# Patient Record
Sex: FEMALE | Race: OTHER | URBAN - METROPOLITAN AREA
[De-identification: names, ages, dates, MRNs, and addresses within clinical notes are randomized per-mention and may not be internally consistent; named-entity substitution may affect disease eponyms.]

---

## 2017-12-08 ENCOUNTER — INPATIENT (INPATIENT)
Facility: HOSPITAL | Age: 32
LOS: 2 days | Discharge: HOME | DRG: 101 | End: 2017-12-11
Attending: HOSPITALIST | Admitting: HOSPITALIST
Payer: COMMERCIAL

## 2017-12-08 VITALS
DIASTOLIC BLOOD PRESSURE: 93 MMHG | HEART RATE: 87 BPM | OXYGEN SATURATION: 100 % | RESPIRATION RATE: 18 BRPM | SYSTOLIC BLOOD PRESSURE: 149 MMHG | TEMPERATURE: 98 F

## 2017-12-08 LAB
ALBUMIN SERPL ELPH-MCNC: 3.2 G/DL — LOW (ref 3.4–5)
ALP SERPL-CCNC: 56 U/L — SIGNIFICANT CHANGE UP (ref 40–120)
ALT FLD-CCNC: 52 U/L — HIGH (ref 12–42)
ANION GAP SERPL CALC-SCNC: 7 MMOL/L — LOW (ref 9–16)
APPEARANCE UR: CLEAR — SIGNIFICANT CHANGE UP
AST SERPL-CCNC: 33 U/L — SIGNIFICANT CHANGE UP (ref 15–37)
BILIRUB SERPL-MCNC: 0.3 MG/DL — SIGNIFICANT CHANGE UP (ref 0.2–1.2)
BILIRUB UR-MCNC: NEGATIVE — SIGNIFICANT CHANGE UP
BUN SERPL-MCNC: 13 MG/DL — SIGNIFICANT CHANGE UP (ref 7–23)
CALCIUM SERPL-MCNC: 9.2 MG/DL — SIGNIFICANT CHANGE UP (ref 8.5–10.5)
CHLORIDE SERPL-SCNC: 107 MMOL/L — SIGNIFICANT CHANGE UP (ref 96–108)
CO2 SERPL-SCNC: 26 MMOL/L — SIGNIFICANT CHANGE UP (ref 22–31)
COLOR SPEC: YELLOW — SIGNIFICANT CHANGE UP
CREAT SERPL-MCNC: 0.88 MG/DL — SIGNIFICANT CHANGE UP (ref 0.5–1.3)
DIFF PNL FLD: (no result)
GLUCOSE SERPL-MCNC: 104 MG/DL — HIGH (ref 70–99)
GLUCOSE UR QL: NEGATIVE — SIGNIFICANT CHANGE UP
HCG UR QL: NEGATIVE — SIGNIFICANT CHANGE UP
HCT VFR BLD CALC: 36.1 % — SIGNIFICANT CHANGE UP (ref 34.5–45)
HGB BLD-MCNC: 11.9 G/DL — SIGNIFICANT CHANGE UP (ref 11.5–15.5)
KETONES UR-MCNC: NEGATIVE — SIGNIFICANT CHANGE UP
LEUKOCYTE ESTERASE UR-ACNC: NEGATIVE — SIGNIFICANT CHANGE UP
MAGNESIUM SERPL-MCNC: 1.9 MG/DL — SIGNIFICANT CHANGE UP (ref 1.6–2.6)
MCHC RBC-ENTMCNC: 28.4 PG — SIGNIFICANT CHANGE UP (ref 27–34)
MCHC RBC-ENTMCNC: 33 G/DL — SIGNIFICANT CHANGE UP (ref 32–36)
MCV RBC AUTO: 86.2 FL — SIGNIFICANT CHANGE UP (ref 80–100)
NITRITE UR-MCNC: NEGATIVE — SIGNIFICANT CHANGE UP
PCP SPEC-MCNC: SIGNIFICANT CHANGE UP
PH UR: 5.5 — SIGNIFICANT CHANGE UP (ref 5–8)
PHOSPHATE SERPL-MCNC: 2.4 MG/DL — LOW (ref 2.5–4.5)
PLATELET # BLD AUTO: 171 K/UL — SIGNIFICANT CHANGE UP (ref 150–400)
POTASSIUM SERPL-MCNC: 4.1 MMOL/L — SIGNIFICANT CHANGE UP (ref 3.5–5.3)
POTASSIUM SERPL-SCNC: 4.1 MMOL/L — SIGNIFICANT CHANGE UP (ref 3.5–5.3)
PROT SERPL-MCNC: 7.6 G/DL — SIGNIFICANT CHANGE UP (ref 6.4–8.2)
PROT UR-MCNC: 100 MG/DL
RBC # BLD: 4.19 M/UL — SIGNIFICANT CHANGE UP (ref 3.8–5.2)
RBC # FLD: 12.7 % — SIGNIFICANT CHANGE UP (ref 10.3–16.9)
SODIUM SERPL-SCNC: 140 MMOL/L — SIGNIFICANT CHANGE UP (ref 132–145)
SP GR SPEC: >=1.03 — SIGNIFICANT CHANGE UP (ref 1–1.03)
UROBILINOGEN FLD QL: 0.2 E.U./DL — SIGNIFICANT CHANGE UP
WBC # BLD: 7.7 K/UL — SIGNIFICANT CHANGE UP (ref 3.8–10.5)
WBC # FLD AUTO: 7.7 K/UL — SIGNIFICANT CHANGE UP (ref 3.8–10.5)

## 2017-12-08 PROCEDURE — 99285 EMERGENCY DEPT VISIT HI MDM: CPT | Mod: 25

## 2017-12-08 PROCEDURE — 95951: CPT | Mod: 26,52

## 2017-12-08 PROCEDURE — 93010 ELECTROCARDIOGRAM REPORT: CPT

## 2017-12-08 PROCEDURE — 70450 CT HEAD/BRAIN W/O DYE: CPT | Mod: 26

## 2017-12-08 NOTE — ED PROVIDER NOTE - CRANIAL NERVE AND PUPILLARY EXAM
cough reflex intact/cranial nerves 2-12 intact/central and peripheral vision intact/corneal reflex intact

## 2017-12-08 NOTE — ED PROVIDER NOTE - OBJECTIVE STATEMENT
32y F with no PMHx, here with coworker BIB EMS s/p seizure. Pt states she was sitting at her desk today and told she had a sz. Coworker present witnessed sz, states pt's whole body began shaking and also began foaming at the mouth. Coworker laid pt down on floor. Sz activity lasted for approximately 5 minutes. When pt woke up 32y F with no PMHx, here with coworker BIB EMS s/p seizure. Pt states she was sitting at her desk today and told she had a sz. Coworker present witnessed sz, states pt's whole body began shaking and also began foaming at the mouth. Pt had also bit inner lip. Coworker laid pt down on floor. Sz activity lasted for approximately 5 minutes. When pt woke up, pt was confused for about 2 minutes. No bowel or urinary incontinence. No hx of sz. 32y F with no PMHx, here with coworker BIB EMS s/p seizure. Pt states she was sitting at her desk today and was lateral told she had a seizure Coworker present  at bedside witnessed seizure - she states pt's whole body began shaking while she was sitting in her chair and also began foaming at the mouth - they ended up laying her down on the floor. she had also bit her lower inner lip. Seizure activity lasted for approximately 5 minutes as per coworker with postictal state of about 2 minutes. No bowel or urinary incontinence. No history of seizure in the past, no hx head trauma or concussion. no fhx tumors or malignancy. Patient back to baseline upon arrival to ED via EMS.

## 2017-12-08 NOTE — ED PROVIDER NOTE - PROGRESS NOTE DETAILS
CT shows extra-axial calcification at the right occipital region just   above tentorium likely dural calcification. Tiny calcified extra-axial   meningioma not excluded. Spoke with Dr. Manzano covering neuro epilepsy - he recommended admission for EEG monitoring. patient agrees with plan. will admit

## 2017-12-08 NOTE — ED PROVIDER NOTE - FAMILY HISTORY
Father  Still living? Unknown  Family history of essential hypertension, Age at diagnosis: Age Unknown  Family history of diabetes mellitus, Age at diagnosis: Age Unknown     Mother  Still living? Yes, Estimated age: Age Unknown  Family history of essential hypertension, Age at diagnosis: Age Unknown  Family history of breast cancer, Age at diagnosis: Age Unknown

## 2017-12-09 DIAGNOSIS — R56.9 UNSPECIFIED CONVULSIONS: ICD-10-CM

## 2017-12-09 DIAGNOSIS — Z29.9 ENCOUNTER FOR PROPHYLACTIC MEASURES, UNSPECIFIED: ICD-10-CM

## 2017-12-09 DIAGNOSIS — R63.8 OTHER SYMPTOMS AND SIGNS CONCERNING FOOD AND FLUID INTAKE: ICD-10-CM

## 2017-12-09 LAB
ANION GAP SERPL CALC-SCNC: 9 MMOL/L — SIGNIFICANT CHANGE UP (ref 5–17)
APTT BLD: 24.8 SEC — LOW (ref 27.5–37.4)
BASOPHILS NFR BLD AUTO: 0.5 % — SIGNIFICANT CHANGE UP (ref 0–2)
BUN SERPL-MCNC: 9 MG/DL — SIGNIFICANT CHANGE UP (ref 7–23)
CALCIUM SERPL-MCNC: 8.7 MG/DL — SIGNIFICANT CHANGE UP (ref 8.4–10.5)
CHLORIDE SERPL-SCNC: 104 MMOL/L — SIGNIFICANT CHANGE UP (ref 96–108)
CO2 SERPL-SCNC: 23 MMOL/L — SIGNIFICANT CHANGE UP (ref 22–31)
CREAT SERPL-MCNC: 0.74 MG/DL — SIGNIFICANT CHANGE UP (ref 0.5–1.3)
EOSINOPHIL NFR BLD AUTO: 2.3 % — SIGNIFICANT CHANGE UP (ref 0–6)
GLUCOSE SERPL-MCNC: 96 MG/DL — SIGNIFICANT CHANGE UP (ref 70–99)
HBA1C BLD-MCNC: 4.9 % — SIGNIFICANT CHANGE UP (ref 4–5.6)
HCT VFR BLD CALC: 34.2 % — LOW (ref 34.5–45)
HGB BLD-MCNC: 11.2 G/DL — LOW (ref 11.5–15.5)
HIV 1+2 AB+HIV1 P24 AG SERPL QL IA: SIGNIFICANT CHANGE UP
INR BLD: 1.08 — SIGNIFICANT CHANGE UP (ref 0.88–1.16)
LYMPHOCYTES # BLD AUTO: 24.3 % — SIGNIFICANT CHANGE UP (ref 13–44)
MAGNESIUM SERPL-MCNC: 2 MG/DL — SIGNIFICANT CHANGE UP (ref 1.6–2.6)
MCHC RBC-ENTMCNC: 27.9 PG — SIGNIFICANT CHANGE UP (ref 27–34)
MCHC RBC-ENTMCNC: 32.7 G/DL — SIGNIFICANT CHANGE UP (ref 32–36)
MCV RBC AUTO: 85.1 FL — SIGNIFICANT CHANGE UP (ref 80–100)
MONOCYTES NFR BLD AUTO: 5.7 % — SIGNIFICANT CHANGE UP (ref 2–14)
NEUTROPHILS NFR BLD AUTO: 67.2 % — SIGNIFICANT CHANGE UP (ref 43–77)
PHOSPHATE SERPL-MCNC: 4.3 MG/DL — SIGNIFICANT CHANGE UP (ref 2.5–4.5)
PLATELET # BLD AUTO: 147 K/UL — LOW (ref 150–400)
POTASSIUM SERPL-MCNC: 3.8 MMOL/L — SIGNIFICANT CHANGE UP (ref 3.5–5.3)
POTASSIUM SERPL-SCNC: 3.8 MMOL/L — SIGNIFICANT CHANGE UP (ref 3.5–5.3)
PROTHROM AB SERPL-ACNC: 12 SEC — SIGNIFICANT CHANGE UP (ref 9.8–12.7)
RBC # BLD: 4.02 M/UL — SIGNIFICANT CHANGE UP (ref 3.8–5.2)
RBC # FLD: 13.3 % — SIGNIFICANT CHANGE UP (ref 10.3–16.9)
SODIUM SERPL-SCNC: 136 MMOL/L — SIGNIFICANT CHANGE UP (ref 135–145)
T4 AB SER-ACNC: 8.05 UG/DL — SIGNIFICANT CHANGE UP (ref 3.17–11.72)
TSH SERPL-MCNC: 2.51 UIU/ML — SIGNIFICANT CHANGE UP (ref 0.35–4.94)
WBC # BLD: 4.4 K/UL — SIGNIFICANT CHANGE UP (ref 3.8–10.5)
WBC # FLD AUTO: 4.4 K/UL — SIGNIFICANT CHANGE UP (ref 3.8–10.5)

## 2017-12-09 PROCEDURE — 93010 ELECTROCARDIOGRAM REPORT: CPT

## 2017-12-09 PROCEDURE — 95951: CPT | Mod: 26

## 2017-12-09 PROCEDURE — 99222 1ST HOSP IP/OBS MODERATE 55: CPT

## 2017-12-09 RX ORDER — POTASSIUM CHLORIDE 20 MEQ
20 PACKET (EA) ORAL ONCE
Qty: 0 | Refills: 0 | Status: COMPLETED | OUTPATIENT
Start: 2017-12-09 | End: 2017-12-09

## 2017-12-09 RX ADMIN — Medication 20 MILLIEQUIVALENT(S): at 10:44

## 2017-12-09 NOTE — H&P ADULT - NSHPSOCIALHISTORY_GEN_ALL_CORE
Denies tobacco, alcohol, or drug use. Works at a desk job. Lives with her parents and siblings in [place]. Not currently sexually active in the past year.

## 2017-12-09 NOTE — H&P ADULT - ASSESSMENT
33yo F w/ no significant PMH presenting with first-time seizure. 33 y/o woman presented after a first time generalized convulsive seizure. CT head 12/8 showed a right occipital likely dural calcification vs calcified meningioma. EEG so far has been normal. Neuro exam is normal. Epilepsy risk factors include fam hx and aforementioned lesion, but the lesion could be an incidental finding. I recommend continuing EEG and doing sleep deprivation, photic, HV, to try to provoke any abnormalities. Considering risk factors, I recommend initiating Keppra at the time of discharge. She will need outpt MRI brain as well.

## 2017-12-09 NOTE — H&P ADULT - NSHPLABSRESULTS_GEN_ALL_CORE
CBC Full  -  ( 08 Dec 2017 12:30 )  WBC Count : 7.7 K/uL  Hemoglobin : 11.9 g/dL  Hematocrit : 36.1 %  Platelet Count - Automated : 171 K/uL  Mean Cell Volume : 86.2 fL  Mean Cell Hemoglobin : 28.4 pg  Mean Cell Hemoglobin Concentration : 33.0 g/dL  12-08    140  |  107  |  13  ----------------------------<  104<H>  4.1   |  26  |  0.88    Ca    9.2      08 Dec 2017 12:30  Phos  2.4     12-08  Mg     1.9     12-08    TPro  7.6  /  Alb  3.2<L>  /  TBili  0.3  /  DBili  x   /  AST  33  /  ALT  52<H>  /  AlkPhos  56  12-08    < from: CT Head No Cont (12.08.17 @ 12:22) >    1. No CT evidence of acute intracranial hemorrhage and no apparent acute   abnormality.  2. Rounded extra-axial calcification at the right occipital region just   above tentorium likely dural calcification. Tiny calcified extra-axial   meningioma not excluded. Brain MR could be obtained for further   characterization on an outpatient basis.        < end of copied text >

## 2017-12-09 NOTE — H&P ADULT - HISTORY OF PRESENT ILLNESS
Ms. Choudhary is a 31yo F w/ no significant PMH (does not follow with a PMD) presenting to [facility] after a first-time seizure. She reports feeling her normal self this morning, and talking with a friend on the phone at work when she woke up on the ground. Her coworkers who were with her and witnessed the event stated that she began shaking all over her body, foaming at the mouth, and was unresponsive. She was slow to regain consciousness, but had returned to baseline mental state by the time EMS showed up. She bit her lip, but was not incontinent of urine or stool. Her mother who is at bedside states that she has never had seizures as a child, and was always healthy. She has a cousin who had epilepsy, but otherwise has no significant family history. She did not have any prodromal symptoms prior to the seizure. She takes ibuprofen as needed for arthritis, but states that she took 5 at one time the morning prior to her seizure. She has never had any head trauma, and did not have any recent fevers, viral illness, or new medication or any drug or alcohol use. She currently denies any headache, vision changes, hearing changes, difficulty swallowing or chewing food, SOB, chest pain, nausea, abdominal pain, diarrhea, constipation, dysuria, LE swelling, rash, or difficulty walking or numbness.

## 2017-12-09 NOTE — H&P ADULT - NSHPPHYSICALEXAM_GEN_ALL_CORE
.  VITAL SIGNS:  T(C): 36.7 (12-09-17 @ 05:23), Max: 37 (12-08-17 @ 13:27)  T(F): 98.1 (12-09-17 @ 05:23), Max: 98.6 (12-08-17 @ 13:27)  HR: 88 (12-09-17 @ 05:23) (83 - 101)  BP: 114/80 (12-09-17 @ 05:23) (113/76 - 149/93)  BP(mean): --  RR: 16 (12-09-17 @ 05:23) (16 - 18)  SpO2: 100% (12-09-17 @ 05:23) (96% - 100%)  Wt(kg): --    PHYSICAL EXAM:    Constitutional: WDWN resting comfortably in bed; NAD  Head: NC/AT  Eyes: PERRL, EOMI, anicteric sclera  ENT: no nasal discharge; uvula midline, no oropharyngeal erythema or exudates; MMM  Neck: supple; no JVD or thyromegaly  Respiratory: CTA B/L; no W/R/R, no retractions  Cardiac: +S1/S2; RRR; no M/R/G  Gastrointestinal: soft, NT/ND; no rebound or guarding; +BSx4  Back: spine midline  Extremities: WWP, no clubbing or cyanosis; no peripheral edema  Musculoskeletal: NROM x4; no joint swelling, tenderness or erythema  Vascular: 2+ radial, DP/PT pulses B/L  Dermatologic: skin warm, dry and intact; no rashes, wounds, or scars  Lymphatic: no submandibular or cervical LAD  Neurologic: AAOx3; CNII-XII intact; strength 5/5 throughout; sensation intact to light touch throughout; negative Romberg; finger-to-nose intact B/L  Psychiatric: affect and characteristics of appearance, verbalizations, behaviors are appropriate

## 2017-12-09 NOTE — H&P ADULT - PROBLEM SELECTOR PLAN 1
first-episode of witnessed seizure with tonic-clonic movements lasting 5 minutes or less. No known inciting factor. CT with Rounded extra-axial calcification at the right occipital region just above tentorium likely dural calcification.   - Admit to EMU for vEEG monitoring   - 12 lead EKG reportedly NSR rate 98 without ST changes. Will repeat.  - Utox negative  - Case discussed with epilepsy attending, will monitor on vEEG. Patient can obtain MRI outpatient to further classify extra-axial calcification.   - f/u AM TSH, Hb A1c, HIV - continue EEG, seizure precautions.  - sleep deprive tonight, tomorrow do HV and photic.  - before discharge will start Keppra 500 mg q12h (1 g a few hours before discharge).  - no driving until cleared by me or another neurologist.   - outpt MRI brain w/wo con, epi protocol.

## 2017-12-09 NOTE — H&P ADULT - ATTENDING COMMENTS
I modified the note above with the exception of the physical exam section. The exam below is my own:    gen: no acute distress, overweight.   HEENT: trachea midline, no jaundice or icterus.  CV: RRR, s1+s2, - m/r/g  Pulm: CTAB  Abd: soft, nt, nd  Ext: well-perfused, no edema.  Alertness: eyes open, pt attentive to examiner.  Orientation: person accurate, date accurate, place accurate.  Language: naming intact. Repetition intact. No paraphasias or neologisms. Fluent with appropriate sentences.  Attention: TABLE forwards intact, backwards intact.  Visual/tactile neglect?: none.  Right left confusion?: no.  Finger agnosia?: no.  II: Visual fields intact.  III, IV, VI: EOMI. No nystagmus. PERRLA 3>2 bilaterally.   V: intact to light touch.  VII: smile symmetrical. Eyebrow elevation symmetrical. Eye closure symmetrical. No flattening of nasolabial fold.  VIII: Able to localize finger rub.  IX, X: palate elevation symmetrical.  XI: sternocleidomastoid 5R/5L, trapezius 5R/5L  XII: no tongue deviation.  Motor: no atrophy or fasciculations. No tremor. No hypo/hyperkinesia. No pronator drift. Tone normal. Finger tap rapid and equal on both sides. Strength: deltoids 5R/5L, biceps 5R/5L, triceps 5R/5L, wrist flexors 5R/5L, wrist extensors 5R/5L,  strong and equal R/L, hip flexors 5R/5L, leg flexors 5R/5L, leg extensors 5R/5L, ankle plantarflexion 5R/5L, ankle dorsiflexion 5R/5L  Coordination: finger to nose without dysmetria or overshoot R/L.   Gait: deferred  Sensory: intact on R and L hemibody to light touch.

## 2017-12-10 LAB
ANION GAP SERPL CALC-SCNC: 13 MMOL/L — SIGNIFICANT CHANGE UP (ref 5–17)
BUN SERPL-MCNC: 7 MG/DL — SIGNIFICANT CHANGE UP (ref 7–23)
CALCIUM SERPL-MCNC: 8.7 MG/DL — SIGNIFICANT CHANGE UP (ref 8.4–10.5)
CHLORIDE SERPL-SCNC: 102 MMOL/L — SIGNIFICANT CHANGE UP (ref 96–108)
CO2 SERPL-SCNC: 20 MMOL/L — LOW (ref 22–31)
CREAT SERPL-MCNC: 0.75 MG/DL — SIGNIFICANT CHANGE UP (ref 0.5–1.3)
GLUCOSE SERPL-MCNC: 99 MG/DL — SIGNIFICANT CHANGE UP (ref 70–99)
HCT VFR BLD CALC: 36.9 % — SIGNIFICANT CHANGE UP (ref 34.5–45)
HGB BLD-MCNC: 12.1 G/DL — SIGNIFICANT CHANGE UP (ref 11.5–15.5)
MAGNESIUM SERPL-MCNC: 2 MG/DL — SIGNIFICANT CHANGE UP (ref 1.6–2.6)
MCHC RBC-ENTMCNC: 27.9 PG — SIGNIFICANT CHANGE UP (ref 27–34)
MCHC RBC-ENTMCNC: 32.8 G/DL — SIGNIFICANT CHANGE UP (ref 32–36)
MCV RBC AUTO: 85.2 FL — SIGNIFICANT CHANGE UP (ref 80–100)
PLATELET # BLD AUTO: 187 K/UL — SIGNIFICANT CHANGE UP (ref 150–400)
POTASSIUM SERPL-MCNC: 4.2 MMOL/L — SIGNIFICANT CHANGE UP (ref 3.5–5.3)
POTASSIUM SERPL-SCNC: 4.2 MMOL/L — SIGNIFICANT CHANGE UP (ref 3.5–5.3)
RBC # BLD: 4.33 M/UL — SIGNIFICANT CHANGE UP (ref 3.8–5.2)
RBC # FLD: 13.3 % — SIGNIFICANT CHANGE UP (ref 10.3–16.9)
SODIUM SERPL-SCNC: 135 MMOL/L — SIGNIFICANT CHANGE UP (ref 135–145)
WBC # BLD: 4.9 K/UL — SIGNIFICANT CHANGE UP (ref 3.8–10.5)
WBC # FLD AUTO: 4.9 K/UL — SIGNIFICANT CHANGE UP (ref 3.8–10.5)

## 2017-12-10 PROCEDURE — 95951: CPT | Mod: 26

## 2017-12-10 PROCEDURE — 99232 SBSQ HOSP IP/OBS MODERATE 35: CPT

## 2017-12-10 RX ORDER — ACETAMINOPHEN 500 MG
650 TABLET ORAL EVERY 6 HOURS
Qty: 0 | Refills: 0 | Status: DISCONTINUED | OUTPATIENT
Start: 2017-12-10 | End: 2017-12-11

## 2017-12-10 RX ORDER — LEVETIRACETAM 250 MG/1
750 TABLET, FILM COATED ORAL
Qty: 0 | Refills: 0 | Status: DISCONTINUED | OUTPATIENT
Start: 2017-12-10 | End: 2017-12-11

## 2017-12-10 RX ORDER — LEVETIRACETAM 250 MG/1
1000 TABLET, FILM COATED ORAL ONCE
Qty: 0 | Refills: 0 | Status: COMPLETED | OUTPATIENT
Start: 2017-12-10 | End: 2017-12-10

## 2017-12-10 RX ADMIN — LEVETIRACETAM 750 MILLIGRAM(S): 250 TABLET, FILM COATED ORAL at 17:19

## 2017-12-10 RX ADMIN — Medication 650 MILLIGRAM(S): at 18:37

## 2017-12-10 RX ADMIN — Medication 650 MILLIGRAM(S): at 20:58

## 2017-12-10 RX ADMIN — LEVETIRACETAM 400 MILLIGRAM(S): 250 TABLET, FILM COATED ORAL at 13:23

## 2017-12-10 NOTE — PROGRESS NOTE ADULT - ASSESSMENT
33 y/o woman presented after a first time generalized convulsive seizure. CT head 12/8 showed a right occipital likely dural calcification vs calcified meningioma. Neuro exam is normal. Epilepsy risk factors include fam hx and aforementioned lesion, but the lesion could be an incidental finding. There was a subclinical left temporal seizure 12/9 pm. The aforementioned right occipital extra-axial lesion was likely an incidental finding. Keppra 1 g was given today, and if sz free for 24 hours we will discharge on Keppra.     - continue EEG, seizure precautions.  - continue Keppra 750 mg bid  - vit D, calcium, folic acid upon discharge  - no driving until cleared by me or another neurologist.   - outpt MRI brain w/wo con, epi protocol.  - f/u with me in 1-2 weeks after discharge.

## 2017-12-10 NOTE — PROGRESS NOTE ADULT - PROBLEM SELECTOR PLAN 1
Pt presented after a first time generalized convulsive seizure. CT head 12/8 showed a right occipital likely dural calcification vs calcified meningioma.  Today a Left Temporal seizure was captured without clinical correlate and pt received 1 g Keppra loading dose and pt was started on 750 mg Keppra po BID. R occipital finding likely incidental.   - c/w Keppra 750 mg po BID  - continue EEG, seizure precautions.  - no driving until cleared by a neurologist.   - outpt MRI brain w/wo con, epi protocol.

## 2017-12-10 NOTE — PROGRESS NOTE ADULT - SUBJECTIVE AND OBJECTIVE BOX
INTERVAL HPI/OVERNIGHT EVENTS: 32yFemale  No acute events overnight per nursing or staff.  Patient seen and examined at bedside this morning. Reports no acute seizure events or anything out of the ordinary. Denies any acute complaints and reports feeling well. Denies LOC, numbness/tingling, involuntary movements, tremor, confusion, palpitations, SOB, chest pain, N/V/D/C, abdominal pain.     ROS: 10 system ROS otherwise negative except for those noted in HPI    VITAL SIGNS:  T(F): 98.1 (12-10-17 @ 13:08)  HR: 99 (12-10-17 @ 13:08)  BP: 126/72 (12-10-17 @ 13:08)  RR: 17 (12-10-17 @ 13:08)  SpO2: 97% (12-10-17 @ 13:08)  Wt(kg): --    PHYSICAL EXAM:  Constitutional: WDWN resting comfortably in bed; NAD  	HEENT: NC/AT, PERRL, EOMI, anicteric sclera, no nasal discharge; uvula midline, no oropharyngeal erythema or exudates; MMM  	Neck: supple; no JVD or thyromegaly or LAD  	Respiratory: CTA B/L; no W/R/R, no retractions  	Cardiac: +S1/S2; RRR; no M/R/G  	Gastrointestinal: soft, NT/ND; no rebound or guarding; +BSx4  	Extremities: WWP, no clubbing or cyanosis; no peripheral edema; : 2+ radial, DP/PT pulses B/L  	Musculoskeletal: NROM x4; no joint swelling, tenderness or erythema  	Dermatologic: skin warm, dry and intact; no rashes, wounds, or scars  	Neurologic: AAOx3; CNII-XII intact; strength 5/5 throughout; sensation intact to light touch throughout; negative Romberg; finger-to-nose intact B/L  Psychiatric:  appropriate mood and affect    MEDICATIONS  (STANDING):  levETIRAcetam 750 milliGRAM(s) Oral two times a day    MEDICATIONS  (PRN):      Allergies    No Known Allergies    Intolerances        LABS:                        12.1   4.9   )-----------( 187      ( 10 Dec 2017 07:44 )             36.9     12-10    135  |  102  |  7   ----------------------------<  99  4.2   |  20<L>  |  0.75    Ca    8.7      10 Dec 2017 07:44  Phos  4.3     12-09  Mg     2.0     12-10      PT/INR - ( 09 Dec 2017 07:44 )   PT: 12.0 sec;   INR: 1.08          PTT - ( 09 Dec 2017 07:44 )  PTT:24.8 sec      RADIOLOGY & ADDITIONAL TESTS:

## 2017-12-10 NOTE — PROGRESS NOTE ADULT - ASSESSMENT
33 y/o woman presented after a first time generalized convulsive seizure. CT head 12/8 showed a right occipital likely dural calcification vs calcified meningioma. Neuro exam is normal. Today a Left Temporal seizure was captured without clinical correlate and pt received 1 g Keppra loading dose and pt was started on 750 mg Keppra po BID. R occipital finding likely incidental. Will need outpt MRI brain as well.

## 2017-12-10 NOTE — PROGRESS NOTE ADULT - SUBJECTIVE AND OBJECTIVE BOX
Subjective  The patient had a subclinical left temporal seizure last night. No complaints currently.    ROS  As above, otherwise negative for constitutional/HEENT/CV/pulm/GI//MSK/neuro/derm/endocrine/psych.     MEDICATIONS  (STANDING):  levETIRAcetam 750 milliGRAM(s) Oral two times a day    MEDICATIONS  (PRN):      T(C): 36.7 (12-10-17 @ 13:08), Max: 37.1 (12-09-17 @ 22:00)  HR: 99 (12-10-17 @ 13:08) (89 - 99)  BP: 126/72 (12-10-17 @ 13:08) (111/71 - 139/82)  RR: 17 (12-10-17 @ 13:08) (16 - 17)  SpO2: 97% (12-10-17 @ 13:08) (97% - 100%)  Wt(kg): --    Gen: NAD  cv: rrr, s1+s2, - m/r/g  pulm: ctab  Eyes open spontaneously. Conversational with appropriate sentences.  EOMI. Visual fields full. PERRL 3>2. Face symmetrical.  Full strength throughout.  Finger-nose-finger intact R/L.  Intact to light touch throughout.    CBC Full  -  ( 10 Dec 2017 07:44 )  WBC Count : 4.9 K/uL  Hemoglobin : 12.1 g/dL  Hematocrit : 36.9 %  Platelet Count - Automated : 187 K/uL  Mean Cell Volume : 85.2 fL  Mean Cell Hemoglobin : 27.9 pg  Mean Cell Hemoglobin Concentration : 32.8 g/dL  Auto Neutrophil # : x  Auto Lymphocyte # : x  Auto Monocyte # : x  Auto Eosinophil # : x  Auto Basophil # : x  Auto Neutrophil % : x  Auto Lymphocyte % : x  Auto Monocyte % : x  Auto Eosinophil % : x  Auto Basophil % : x    12-10    135  |  102  |  7   ----------------------------<  99  4.2   |  20<L>  |  0.75    Ca    8.7      10 Dec 2017 07:44  Phos  4.3     12-09  Mg     2.0     12-10        PT/INR - ( 09 Dec 2017 07:44 )   PT: 12.0 sec;   INR: 1.08          PTT - ( 09 Dec 2017 07:44 )  PTT:24.8 sec

## 2017-12-11 ENCOUNTER — TRANSCRIPTION ENCOUNTER (OUTPATIENT)
Age: 32
End: 2017-12-11

## 2017-12-11 VITALS
SYSTOLIC BLOOD PRESSURE: 135 MMHG | OXYGEN SATURATION: 98 % | HEART RATE: 96 BPM | RESPIRATION RATE: 16 BRPM | TEMPERATURE: 98 F | DIASTOLIC BLOOD PRESSURE: 92 MMHG

## 2017-12-11 LAB
ANION GAP SERPL CALC-SCNC: 14 MMOL/L — SIGNIFICANT CHANGE UP (ref 5–17)
BUN SERPL-MCNC: 10 MG/DL — SIGNIFICANT CHANGE UP (ref 7–23)
CALCIUM SERPL-MCNC: 8.8 MG/DL — SIGNIFICANT CHANGE UP (ref 8.4–10.5)
CHLORIDE SERPL-SCNC: 102 MMOL/L — SIGNIFICANT CHANGE UP (ref 96–108)
CO2 SERPL-SCNC: 19 MMOL/L — LOW (ref 22–31)
CREAT SERPL-MCNC: 0.65 MG/DL — SIGNIFICANT CHANGE UP (ref 0.5–1.3)
GLUCOSE SERPL-MCNC: 102 MG/DL — HIGH (ref 70–99)
HCT VFR BLD CALC: 38.2 % — SIGNIFICANT CHANGE UP (ref 34.5–45)
HGB BLD-MCNC: 12.7 G/DL — SIGNIFICANT CHANGE UP (ref 11.5–15.5)
MAGNESIUM SERPL-MCNC: 2 MG/DL — SIGNIFICANT CHANGE UP (ref 1.6–2.6)
MCHC RBC-ENTMCNC: 28.1 PG — SIGNIFICANT CHANGE UP (ref 27–34)
MCHC RBC-ENTMCNC: 33.2 G/DL — SIGNIFICANT CHANGE UP (ref 32–36)
MCV RBC AUTO: 84.5 FL — SIGNIFICANT CHANGE UP (ref 80–100)
PLATELET # BLD AUTO: 183 K/UL — SIGNIFICANT CHANGE UP (ref 150–400)
POTASSIUM SERPL-MCNC: 4.4 MMOL/L — SIGNIFICANT CHANGE UP (ref 3.5–5.3)
POTASSIUM SERPL-SCNC: 4.4 MMOL/L — SIGNIFICANT CHANGE UP (ref 3.5–5.3)
RBC # BLD: 4.52 M/UL — SIGNIFICANT CHANGE UP (ref 3.8–5.2)
RBC # FLD: 13.2 % — SIGNIFICANT CHANGE UP (ref 10.3–16.9)
SODIUM SERPL-SCNC: 135 MMOL/L — SIGNIFICANT CHANGE UP (ref 135–145)
WBC # BLD: 4.6 K/UL — SIGNIFICANT CHANGE UP (ref 3.8–10.5)
WBC # FLD AUTO: 4.6 K/UL — SIGNIFICANT CHANGE UP (ref 3.8–10.5)

## 2017-12-11 PROCEDURE — 80307 DRUG TEST PRSMV CHEM ANLYZR: CPT

## 2017-12-11 PROCEDURE — 87389 HIV-1 AG W/HIV-1&-2 AB AG IA: CPT

## 2017-12-11 PROCEDURE — 81025 URINE PREGNANCY TEST: CPT

## 2017-12-11 PROCEDURE — 83036 HEMOGLOBIN GLYCOSYLATED A1C: CPT

## 2017-12-11 PROCEDURE — 99285 EMERGENCY DEPT VISIT HI MDM: CPT | Mod: 25

## 2017-12-11 PROCEDURE — 80053 COMPREHEN METABOLIC PANEL: CPT

## 2017-12-11 PROCEDURE — 85027 COMPLETE CBC AUTOMATED: CPT

## 2017-12-11 PROCEDURE — 84443 ASSAY THYROID STIM HORMONE: CPT

## 2017-12-11 PROCEDURE — 95813 EEG EXTND MNTR 61-119 MIN: CPT | Mod: 26

## 2017-12-11 PROCEDURE — 85730 THROMBOPLASTIN TIME PARTIAL: CPT

## 2017-12-11 PROCEDURE — 80048 BASIC METABOLIC PNL TOTAL CA: CPT

## 2017-12-11 PROCEDURE — 99239 HOSP IP/OBS DSCHRG MGMT >30: CPT

## 2017-12-11 PROCEDURE — 83735 ASSAY OF MAGNESIUM: CPT

## 2017-12-11 PROCEDURE — 84100 ASSAY OF PHOSPHORUS: CPT

## 2017-12-11 PROCEDURE — 36415 COLL VENOUS BLD VENIPUNCTURE: CPT

## 2017-12-11 PROCEDURE — 84436 ASSAY OF TOTAL THYROXINE: CPT

## 2017-12-11 PROCEDURE — 81001 URINALYSIS AUTO W/SCOPE: CPT

## 2017-12-11 PROCEDURE — 95951: CPT

## 2017-12-11 PROCEDURE — 70450 CT HEAD/BRAIN W/O DYE: CPT

## 2017-12-11 PROCEDURE — 85025 COMPLETE CBC W/AUTO DIFF WBC: CPT

## 2017-12-11 PROCEDURE — 93005 ELECTROCARDIOGRAM TRACING: CPT

## 2017-12-11 PROCEDURE — 85610 PROTHROMBIN TIME: CPT

## 2017-12-11 RX ORDER — LEVETIRACETAM 250 MG/1
1 TABLET, FILM COATED ORAL
Qty: 60 | Refills: 0 | OUTPATIENT
Start: 2017-12-11 | End: 2018-01-09

## 2017-12-11 RX ORDER — CHOLECALCIFEROL (VITAMIN D3) 125 MCG
2 CAPSULE ORAL
Qty: 60 | Refills: 0 | OUTPATIENT
Start: 2017-12-11 | End: 2018-01-09

## 2017-12-11 RX ORDER — CALCIUM CARBONATE 500(1250)
2 TABLET ORAL
Qty: 60 | Refills: 0 | OUTPATIENT
Start: 2017-12-11 | End: 2018-01-09

## 2017-12-11 RX ORDER — FOLIC ACID 0.8 MG
1 TABLET ORAL
Qty: 30 | Refills: 0 | OUTPATIENT
Start: 2017-12-11 | End: 2018-01-09

## 2017-12-11 RX ADMIN — LEVETIRACETAM 750 MILLIGRAM(S): 250 TABLET, FILM COATED ORAL at 17:02

## 2017-12-11 RX ADMIN — LEVETIRACETAM 750 MILLIGRAM(S): 250 TABLET, FILM COATED ORAL at 06:25

## 2017-12-11 NOTE — DISCHARGE NOTE ADULT - CARE PROVIDER_API CALL
Chandana Manzano (MD), Clinical Neurophysiology; Neurology  [address], [place] [address]  Phone: [phone]  Fax: [phone]

## 2017-12-11 NOTE — DISCHARGE NOTE ADULT - MEDICATION SUMMARY - MEDICATIONS TO TAKE
I will START or STAY ON the medications listed below when I get home from the hospital:    ibuprofen 200 mg oral tablet  -- 1 tab(s) by mouth every 6 hours  -- Indication: For Need for prophylactic measure    calcium carbonate 600 mg oral tablet, chewable  -- 2 tab(s) by mouth once a day   -- Indication: For Nutrition, metabolism, and development symptoms    levETIRAcetam 750 mg oral tablet  -- 1 tab(s) by mouth 2 times a day  -- Indication: For SEIZURE    folic acid 1 mg oral tablet  -- 1 tab(s) by mouth once a day   -- Indication: For Nutrition, metabolism, and development symptoms    Vitamin D3 400 intl units oral capsule  -- 2 cap(s) by mouth once a day   -- Take with food.    -- Indication: For Nutrition, metabolism, and development symptoms

## 2017-12-11 NOTE — DISCHARGE NOTE ADULT - PLAN OF CARE
To prevent and control seizures You were admitted in order to monitor your seizure activity with a video EEG. During your hospital stay, a seizure event was captured in the Left Temporal region of your brain, and you were started on Keppra, a medication to help control these seizures. You will be taking 750 mg twice a day by mouth. Please follow up with Dr. Manzano within 1-2 weeks of being discharged, and please refrain from driving or operating heavy machinery until cleared by your neurologist.   You will need an MRI brain with and without contrast. Please follow up with Dr. Manzano for a location to set up your appointment.  You should also take the following supplements:  Folate 1 mg daily, Vitamin D 800 units daily, and Calcium Carbonate 1200 units daily. During your hospital stay, a seizure was captured in the Left Temporal region of your brain, and you were started on Keppra, a medication to help prevent seizures. You will be taking 750 mg twice a day by mouth. Please follow up with Dr. Manzano within 1-2 weeks of being discharged. Do not drive until cleared by your neurologist, and avoid activities that will result in harm to you or others if you have a seizure.  You will need an MRI brain with and without contrast. Please follow up with Dr. Manzano for a location to set up your appointment.  You should also take the following supplements:  Folate 1 mg daily, Vitamin D 800 units daily, and Calcium 1200 mg daily.

## 2017-12-11 NOTE — DISCHARGE NOTE ADULT - CARE PLAN
Principal Discharge DX:	Seizure  Goal:	To prevent and control seizures  Instructions for follow-up, activity and diet:	You were admitted in order to monitor your seizure activity with a video EEG. During your hospital stay, a seizure event was captured in the Left Temporal region of your brain, and you were started on Keppra, a medication to help control these seizures. You will be taking 750 mg twice a day by mouth. Please follow up with Dr. Manzano within 1-2 weeks of being discharged, and please refrain from driving or operating heavy machinery until cleared by your neurologist.   You will need an MRI brain with and without contrast. Please follow up with Dr. Manzano for a location to set up your appointment.  You should also take the following supplements:  Folate 1 mg daily, Vitamin D 800 units daily, and Calcium Carbonate 1200 units daily. Principal Discharge DX:	Seizure  Goal:	To prevent and control seizures  Instructions for follow-up, activity and diet:	During your hospital stay, a seizure was captured in the Left Temporal region of your brain, and you were started on Keppra, a medication to help prevent seizures. You will be taking 750 mg twice a day by mouth. Please follow up with Dr. Manzano within 1-2 weeks of being discharged. Do not drive until cleared by your neurologist, and avoid activities that will result in harm to you or others if you have a seizure.  You will need an MRI brain with and without contrast. Please follow up with Dr. Manzano for a location to set up your appointment.  You should also take the following supplements:  Folate 1 mg daily, Vitamin D 800 units daily, and Calcium 1200 mg daily.

## 2017-12-11 NOTE — DISCHARGE NOTE ADULT - PATIENT PORTAL LINK FT
“You can access the [facility] Patient Portal, offered by [facility], by registering with the following website: [email or web address]”

## 2017-12-11 NOTE — DISCHARGE NOTE ADULT - HOSPITAL COURSE
31 y/o woman with no significant PMH who presented after a first time generalized convulsive seizure to [facility]. CT head 12/8 showed a right occipital likely dural calcification vs calcified meningioma. Neuro exam was normal throughout the hospital stay. Epilepsy risk factors include fam hx (cousin with epilepsy) and aforementioned lesion, but the lesion was determined to be likely an incidental finding given that a subclinical left temporal seizure was captured on 12/9.  Patient was given Keppra 1 g following the subclinical seizure and started on Keppra 750 mg BID. She remained seizure free for 24 hours and was clinically stable for discharge. Patient was discharged with instructions to take Keppra 750 mg po BID, vit.D3 800 units daily, calcium carbonate 1200 units daily and Folic Acid 1 mg daily.  Patient also advised to haver MRI w/wo contrast outpatient, and to f/u with neurology in 1-2 weeks. Pt cannot drive until cleared by neurology. 33 y/o woman with no significant PMH who presented after a first time generalized convulsive seizure to [facility]. CT head 12/8 showed a right occipital likely dural calcification vs calcified meningioma. Neuro exam was normal throughout the hospital stay. Epilepsy risk factors include fam hx (cousin with epilepsy) and aforementioned lesion, but the lesion was determined to be likely an incidental finding given that a subclinical left temporal seizure was captured on EEG 12/9.  Patient was given Keppra 1 g following the subclinical seizure and started on Keppra 750 mg BID. She remained seizure free for 24 hours and was clinically stable for discharge. Patient was discharged with instructions to take Keppra 750 mg po BID, vit.D3 800 units daily, calcium carbonate 1200 mg daily and Folic Acid 1 mg daily.  Patient also told to have MRI brain w/wo contrast outpatient, and to f/u with neurology in 1-2 weeks. Pt cannot drive until cleared by neurology.

## 2017-12-13 ENCOUNTER — OTHER (OUTPATIENT)
Age: 32
End: 2017-12-13

## 2017-12-13 DIAGNOSIS — G40.909 EPILEPSY, UNSPECIFIED, NOT INTRACTABLE, WITHOUT STATUS EPILEPTICUS: ICD-10-CM

## 2017-12-13 DIAGNOSIS — G40.409 OTHER GENERALIZED EPILEPSY AND EPILEPTIC SYNDROMES, NOT INTRACTABLE, WITHOUT STATUS EPILEPTICUS: ICD-10-CM

## 2017-12-13 DIAGNOSIS — Z79.1 LONG TERM (CURRENT) USE OF NON-STEROIDAL ANTI-INFLAMMATORIES (NSAID): ICD-10-CM

## 2017-12-13 DIAGNOSIS — Z82.0 FAMILY HISTORY OF EPILEPSY AND OTHER DISEASES OF THE NERVOUS SYSTEM: ICD-10-CM

## 2017-12-13 PROBLEM — Z00.00 ENCOUNTER FOR PREVENTIVE HEALTH EXAMINATION: Status: ACTIVE | Noted: 2017-12-13

## 2017-12-20 ENCOUNTER — APPOINTMENT (OUTPATIENT)
Dept: NEUROLOGY | Facility: CLINIC | Age: 32
End: 2017-12-20
Payer: COMMERCIAL

## 2017-12-20 VITALS
OXYGEN SATURATION: 98 % | HEIGHT: 67 IN | TEMPERATURE: 98.7 F | SYSTOLIC BLOOD PRESSURE: 132 MMHG | HEART RATE: 111 BPM | DIASTOLIC BLOOD PRESSURE: 86 MMHG | BODY MASS INDEX: 45.04 KG/M2 | WEIGHT: 287 LBS

## 2017-12-20 PROCEDURE — 99214 OFFICE O/P EST MOD 30 MIN: CPT

## 2017-12-27 ENCOUNTER — EMERGENCY (EMERGENCY)
Facility: HOSPITAL | Age: 32
LOS: 1 days | Discharge: HOME | End: 2017-12-27
Attending: EMERGENCY MEDICINE | Admitting: EMERGENCY MEDICINE
Payer: COMMERCIAL

## 2017-12-27 ENCOUNTER — CLINICAL ADVICE (OUTPATIENT)
Age: 32
End: 2017-12-27

## 2017-12-27 VITALS
SYSTOLIC BLOOD PRESSURE: 114 MMHG | RESPIRATION RATE: 18 BRPM | HEIGHT: 67 IN | DIASTOLIC BLOOD PRESSURE: 83 MMHG | OXYGEN SATURATION: 96 % | TEMPERATURE: 99 F | HEART RATE: 118 BPM | WEIGHT: 281.97 LBS

## 2017-12-27 VITALS — TEMPERATURE: 99 F

## 2017-12-27 DIAGNOSIS — R21 RASH AND OTHER NONSPECIFIC SKIN ERUPTION: ICD-10-CM

## 2017-12-27 DIAGNOSIS — R35.0 FREQUENCY OF MICTURITION: ICD-10-CM

## 2017-12-27 DIAGNOSIS — Z79.1 LONG TERM (CURRENT) USE OF NON-STEROIDAL ANTI-INFLAMMATORIES (NSAID): ICD-10-CM

## 2017-12-27 DIAGNOSIS — Z79.899 OTHER LONG TERM (CURRENT) DRUG THERAPY: ICD-10-CM

## 2017-12-27 LAB
APPEARANCE UR: CLEAR — SIGNIFICANT CHANGE UP
BILIRUB UR-MCNC: NEGATIVE — SIGNIFICANT CHANGE UP
COLOR SPEC: YELLOW — SIGNIFICANT CHANGE UP
DIFF PNL FLD: (no result)
GLUCOSE UR QL: NEGATIVE — SIGNIFICANT CHANGE UP
KETONES UR-MCNC: NEGATIVE — SIGNIFICANT CHANGE UP
LEUKOCYTE ESTERASE UR-ACNC: NEGATIVE — SIGNIFICANT CHANGE UP
NITRITE UR-MCNC: NEGATIVE — SIGNIFICANT CHANGE UP
PH UR: 6 — SIGNIFICANT CHANGE UP (ref 5–8)
PROT UR-MCNC: 30 MG/DL
SP GR SPEC: 1.02 — SIGNIFICANT CHANGE UP (ref 1–1.03)
UROBILINOGEN FLD QL: 0.2 E.U./DL — SIGNIFICANT CHANGE UP

## 2017-12-27 PROCEDURE — 87086 URINE CULTURE/COLONY COUNT: CPT

## 2017-12-27 PROCEDURE — 93005 ELECTROCARDIOGRAM TRACING: CPT

## 2017-12-27 PROCEDURE — 99283 EMERGENCY DEPT VISIT LOW MDM: CPT | Mod: 25

## 2017-12-27 PROCEDURE — 99284 EMERGENCY DEPT VISIT MOD MDM: CPT | Mod: 25

## 2017-12-27 PROCEDURE — 93010 ELECTROCARDIOGRAM REPORT: CPT

## 2017-12-27 PROCEDURE — 81001 URINALYSIS AUTO W/SCOPE: CPT

## 2017-12-27 PROCEDURE — 93010 ELECTROCARDIOGRAM REPORT: CPT | Mod: 77

## 2017-12-27 RX ORDER — IBUPROFEN 200 MG
1 TABLET ORAL
Qty: 0 | Refills: 0 | COMMUNITY

## 2017-12-27 RX ORDER — DIPHENHYDRAMINE HCL 50 MG
25 CAPSULE ORAL ONCE
Qty: 0 | Refills: 0 | Status: COMPLETED | OUTPATIENT
Start: 2017-12-27 | End: 2017-12-27

## 2017-12-27 RX ADMIN — Medication 25 MILLIGRAM(S): at 17:06

## 2017-12-27 NOTE — ED PROVIDER NOTE - ENMT, MLM
Airway patent, Nasal mucosa clear. Mouth with normal mucosa. Throat has no vesicles, no oropharyngeal exudates and uvula is midline. no lip / tongue/ pharyngeal /sublingual edema. no drooling or stridor. no intra-oral lesions

## 2017-12-27 NOTE — ED ADULT TRIAGE NOTE - ARRIVAL INFO ADDITIONAL COMMENTS
c.o "blotches to arms" since this weekend and fever since this friday. states she has a similar reaction on old seizure medications (unable to recall name of medication). state she started new seizure medications called oxcarbazepine 300mg since dec 13th. c.o itch to right hand and redness to b.l hands. denies any cough, chest pain, chills. c.o "blotches to arms" since this weekend and fever since this friday. states she has a similar reaction on old seizure medications (unable to recall name of medication). state she started new seizure medications called oxcarbazepine 300mg since dec 13th. c.o itch to right hand and redness to b.l hands. denies any cough, chest pain, chills. no rash noted.

## 2017-12-27 NOTE — ED ADULT NURSE NOTE - OBJECTIVE STATEMENT
patient complains of rash to hands for the last few days she states that she was recently switched to oxcarbazepine from Keppra for her seizures. denies chest pain, shortness of breath. patient also complains of intermittent fever with urinary frequency and hesitancy. no signs of distress

## 2017-12-27 NOTE — ED PROVIDER NOTE - SKIN, MLM
Skin normal color for race, warm, dry and intact. minimally erythematous reticular lacy to b/l palms and volar surfaces of forearms only. no urticaria. no rash to remainder of body.

## 2017-12-27 NOTE — ED PROVIDER NOTE - MEDICAL DECISION MAKING DETAILS
Pt p/w fever, urinary complaints, rash. Rash very minimal on exam. Flat, no urticaria, isolated to forearms. Rash does not appear c/w allergic reaction. Trial of Benadryl to see if it improves. Fever w/ urinary complaints. No other infectious sx. Will check UA. Dispo pending w/u and clinical status Pt p/w fever, urinary complaints, rash. Rash very minimal on exam. Flat, no urticaria, isolated to forearms. Rash does not appear c/w allergic reaction. Trial of Benadryl to see if it improves. Fever w/ urinary complaints. No other infectious sx. Will check UA. Dispo pending w/u and clinical status. Consider viral syndrome, viral exanthem, B19.

## 2017-12-27 NOTE — ED PROVIDER NOTE - OBJECTIVE STATEMENT
Pt w/ PMHx seizure p/w rash, fever. Pt reports fever onset 5 days ago, Tmax 102. Pt denies cough, rhinorrhea, nasal congestion, sore throat, n/v/d, abdominal pain. Pt reports urinary frequency and hesitancy. No urgency or hematuria. No flank pain. No myalgias. No HA. No neck stiffness. Today pt noted itching and blotching to hands and arms. Pt reports she was started on Oxcarbazepine 12/13 with slow increase to dose 600 mg BID, which she is currently taking. Pt was previously on Keppra since onset of seizures and discharge on Keppra from [facility] on 12/11, however pt reports she had a similar but worse reaction, and was then changed to the Oxcarbazepine. Pt has also been on Folate and Vitamin D since 12/11. The itching is isolated to the hands. The blotchy rash is isolated to the b/l forearms. No generalized rash. No oral involvement. No new soaps or detergents

## 2017-12-28 LAB
CULTURE RESULTS: SIGNIFICANT CHANGE UP
SPECIMEN SOURCE: SIGNIFICANT CHANGE UP

## 2018-01-19 ENCOUNTER — APPOINTMENT (OUTPATIENT)
Dept: NEUROLOGY | Facility: CLINIC | Age: 33
End: 2018-01-19
Payer: COMMERCIAL

## 2018-01-19 VITALS
WEIGHT: 287 LBS | SYSTOLIC BLOOD PRESSURE: 119 MMHG | HEART RATE: 109 BPM | DIASTOLIC BLOOD PRESSURE: 86 MMHG | BODY MASS INDEX: 45.04 KG/M2 | OXYGEN SATURATION: 98 % | HEIGHT: 67 IN

## 2018-01-19 PROCEDURE — 99213 OFFICE O/P EST LOW 20 MIN: CPT

## 2018-01-23 LAB
25(OH)D3 SERPL-MCNC: 17.8 NG/ML
ALBUMIN SERPL ELPH-MCNC: 3.9 G/DL
ALP BLD-CCNC: 61 U/L
ALT SERPL-CCNC: 30 U/L
ANION GAP SERPL CALC-SCNC: 13 MMOL/L
AST SERPL-CCNC: 24 U/L
BASOPHILS # BLD AUTO: 0.01 K/UL
BASOPHILS NFR BLD AUTO: 0.1 %
BILIRUB SERPL-MCNC: 0.2 MG/DL
BUN SERPL-MCNC: 14 MG/DL
CALCIUM SERPL-MCNC: 9.1 MG/DL
CHLORIDE SERPL-SCNC: 105 MMOL/L
CO2 SERPL-SCNC: 24 MMOL/L
CREAT SERPL-MCNC: 0.8 MG/DL
EOSINOPHIL # BLD AUTO: 0.28 K/UL
EOSINOPHIL NFR BLD AUTO: 2.9 %
GLUCOSE SERPL-MCNC: 95 MG/DL
HCT VFR BLD CALC: 37 %
HGB BLD-MCNC: 11.7 G/DL
IMM GRANULOCYTES NFR BLD AUTO: 0.4 %
LYMPHOCYTES # BLD AUTO: 1.4 K/UL
LYMPHOCYTES NFR BLD AUTO: 14.7 %
MAN DIFF?: NORMAL
MCHC RBC-ENTMCNC: 27.3 PG
MCHC RBC-ENTMCNC: 31.6 GM/DL
MCV RBC AUTO: 86.2 FL
MONOCYTES # BLD AUTO: 0.55 K/UL
MONOCYTES NFR BLD AUTO: 5.8 %
NEUTROPHILS # BLD AUTO: 7.23 K/UL
NEUTROPHILS NFR BLD AUTO: 76.1 %
OXCARBAZEPINE SERPL-MCNC: 21 UG/ML
PLATELET # BLD AUTO: 264 K/UL
POTASSIUM SERPL-SCNC: 4.4 MMOL/L
PROT SERPL-MCNC: 7.5 G/DL
RBC # BLD: 4.29 M/UL
RBC # FLD: 14.6 %
SODIUM SERPL-SCNC: 142 MMOL/L
WBC # FLD AUTO: 9.51 K/UL

## 2018-03-16 ENCOUNTER — APPOINTMENT (OUTPATIENT)
Dept: NEUROLOGY | Facility: CLINIC | Age: 33
End: 2018-03-16
Payer: COMMERCIAL

## 2018-03-16 VITALS
DIASTOLIC BLOOD PRESSURE: 83 MMHG | HEART RATE: 114 BPM | SYSTOLIC BLOOD PRESSURE: 137 MMHG | BODY MASS INDEX: 45.04 KG/M2 | HEIGHT: 67 IN | OXYGEN SATURATION: 97 % | WEIGHT: 287 LBS

## 2018-03-16 PROCEDURE — 99213 OFFICE O/P EST LOW 20 MIN: CPT

## 2018-03-29 ENCOUNTER — OUTPATIENT (OUTPATIENT)
Dept: OUTPATIENT SERVICES | Facility: HOSPITAL | Age: 33
LOS: 1 days | End: 2018-03-29

## 2018-03-29 ENCOUNTER — APPOINTMENT (OUTPATIENT)
Dept: MRI IMAGING | Facility: CLINIC | Age: 33
End: 2018-03-29
Payer: COMMERCIAL

## 2018-03-29 PROCEDURE — 70553 MRI BRAIN STEM W/O & W/DYE: CPT | Mod: 26

## 2018-03-30 ENCOUNTER — APPOINTMENT (OUTPATIENT)
Dept: OPHTHALMOLOGY | Facility: CLINIC | Age: 33
End: 2018-03-30
Payer: COMMERCIAL

## 2018-03-30 DIAGNOSIS — H53.9 UNSPECIFIED VISUAL DISTURBANCE: ICD-10-CM

## 2018-03-30 PROCEDURE — 92083 EXTENDED VISUAL FIELD XM: CPT

## 2018-03-30 PROCEDURE — 99244 OFF/OP CNSLTJ NEW/EST MOD 40: CPT

## 2018-04-12 ENCOUNTER — OTHER (OUTPATIENT)
Age: 33
End: 2018-04-12

## 2018-04-23 ENCOUNTER — LABORATORY RESULT (OUTPATIENT)
Age: 33
End: 2018-04-23

## 2018-04-24 ENCOUNTER — APPOINTMENT (OUTPATIENT)
Dept: INTERNAL MEDICINE | Facility: CLINIC | Age: 33
End: 2018-04-24
Payer: COMMERCIAL

## 2018-04-24 ENCOUNTER — LABORATORY RESULT (OUTPATIENT)
Age: 33
End: 2018-04-24

## 2018-04-24 ENCOUNTER — NON-APPOINTMENT (OUTPATIENT)
Age: 33
End: 2018-04-24

## 2018-04-24 VITALS
BODY MASS INDEX: 42.53 KG/M2 | HEIGHT: 67 IN | OXYGEN SATURATION: 98 % | DIASTOLIC BLOOD PRESSURE: 80 MMHG | TEMPERATURE: 100 F | WEIGHT: 271 LBS | SYSTOLIC BLOOD PRESSURE: 116 MMHG | HEART RATE: 146 BPM

## 2018-04-24 DIAGNOSIS — Z82.69 FAMILY HISTORY OF OTHER DISEASES OF THE MUSCULOSKELETAL SYSTEM AND CONNECTIVE TISSUE: ICD-10-CM

## 2018-04-24 DIAGNOSIS — R00.0 TACHYCARDIA, UNSPECIFIED: ICD-10-CM

## 2018-04-24 DIAGNOSIS — Z82.0 FAMILY HISTORY OF EPILEPSY AND OTHER DISEASES OF THE NERVOUS SYSTEM: ICD-10-CM

## 2018-04-24 DIAGNOSIS — Z11.3 ENCOUNTER FOR SCREENING FOR INFECTIONS WITH A PREDOMINANTLY SEXUAL MODE OF TRANSMISSION: ICD-10-CM

## 2018-04-24 DIAGNOSIS — M79.1 MYALGIA: ICD-10-CM

## 2018-04-24 DIAGNOSIS — M25.50 PAIN IN UNSPECIFIED JOINT: ICD-10-CM

## 2018-04-24 DIAGNOSIS — Z82.49 FAMILY HISTORY OF ISCHEMIC HEART DISEASE AND OTHER DISEASES OF THE CIRCULATORY SYSTEM: ICD-10-CM

## 2018-04-24 DIAGNOSIS — Z80.3 FAMILY HISTORY OF MALIGNANT NEOPLASM OF BREAST: ICD-10-CM

## 2018-04-24 DIAGNOSIS — Z13.220 ENCOUNTER FOR SCREENING FOR LIPOID DISORDERS: ICD-10-CM

## 2018-04-24 DIAGNOSIS — Z83.3 FAMILY HISTORY OF DIABETES MELLITUS: ICD-10-CM

## 2018-04-24 DIAGNOSIS — Z13.1 ENCOUNTER FOR SCREENING FOR DIABETES MELLITUS: ICD-10-CM

## 2018-04-24 PROCEDURE — 99205 OFFICE O/P NEW HI 60 MIN: CPT | Mod: 25

## 2018-04-24 PROCEDURE — 36415 COLL VENOUS BLD VENIPUNCTURE: CPT

## 2018-04-24 PROCEDURE — 93000 ELECTROCARDIOGRAM COMPLETE: CPT

## 2018-04-24 RX ORDER — CHROMIUM 200 MCG
TABLET ORAL
Refills: 0 | Status: COMPLETED | COMMUNITY
End: 2018-04-24

## 2018-04-24 RX ORDER — IBUPROFEN 200 MG/1
200 CAPSULE, LIQUID FILLED ORAL
Refills: 0 | Status: COMPLETED | COMMUNITY
Start: 2018-04-24 | End: 2018-04-24

## 2018-04-24 RX ORDER — POTASSIUM GLUCONATE 595(99)MG
10 MCG TABLET ORAL
Qty: 60 | Refills: 0 | Status: COMPLETED | COMMUNITY
Start: 2018-01-23 | End: 2018-04-24

## 2018-04-24 RX ORDER — CHROMIUM 200 MCG
TABLET ORAL DAILY
Refills: 0 | Status: COMPLETED | COMMUNITY
End: 2018-04-24

## 2018-04-24 RX ORDER — CHROMIUM 200 MCG
10 MCG TABLET ORAL
Qty: 60 | Refills: 5 | Status: COMPLETED | COMMUNITY
Start: 2018-01-23 | End: 2018-04-24

## 2018-05-24 ENCOUNTER — APPOINTMENT (OUTPATIENT)
Dept: INTERNAL MEDICINE | Facility: CLINIC | Age: 33
End: 2018-05-24

## 2018-05-25 ENCOUNTER — APPOINTMENT (OUTPATIENT)
Dept: OPHTHALMOLOGY | Facility: CLINIC | Age: 33
End: 2018-05-25

## 2018-05-29 LAB
25(OH)D3 SERPL-MCNC: 12.7 NG/ML
ALBUMIN SERPL ELPH-MCNC: 3.6 G/DL
ALP BLD-CCNC: 45 U/L
ALT SERPL-CCNC: 29 U/L
ANA PAT FLD IF-IMP: ABNORMAL
ANA SER IF-ACNC: ABNORMAL
ANCA AB SER-IMP: ABNORMAL
ANION GAP SERPL CALC-SCNC: 16 MMOL/L
APPEARANCE: ABNORMAL
AST SERPL-CCNC: 42 U/L
B BURGDOR IGG+IGM SER QL IB: NORMAL
BASOPHILS # BLD AUTO: 0 K/UL
BASOPHILS NFR BLD AUTO: 0 %
BILIRUB SERPL-MCNC: 0.5 MG/DL
BILIRUBIN URINE: ABNORMAL
BLOOD URINE: NEGATIVE
BUN SERPL-MCNC: 10 MG/DL
C TRACH RRNA SPEC QL NAA+PROBE: NOT DETECTED
C-ANCA SER-ACNC: NEGATIVE
CALCIUM SERPL-MCNC: 9.4 MG/DL
CCP AB SER IA-ACNC: <8 UNITS
CHLORIDE SERPL-SCNC: 101 MMOL/L
CHOLEST SERPL-MCNC: 139 MG/DL
CHOLEST/HDLC SERPL: 4.2 RATIO
CMV IGG SERPL QL: 0.54 U/ML
CMV IGG SERPL-IMP: NEGATIVE
CMV IGM SERPL QL: 108 AU/ML
CMV IGM SERPL QL: POSITIVE
CO2 SERPL-SCNC: 22 MMOL/L
COLOR: ABNORMAL
CREAT SERPL-MCNC: 0.77 MG/DL
CRP SERPL-MCNC: 5.3 MG/DL
DSDNA AB SER-ACNC: 189 IU/ML
ENA RNP AB SER IA-ACNC: 0.2 AL
ENA SM AB SER IA-ACNC: <0.2 AL
EOSINOPHIL # BLD AUTO: 0.05 K/UL
EOSINOPHIL NFR BLD AUTO: 0.9 %
ERYTHROCYTE [SEDIMENTATION RATE] IN BLOOD BY WESTERGREN METHOD: 38 MM/HR
FOLATE SERPL-MCNC: 4.9 NG/ML
GLUCOSE QUALITATIVE U: NEGATIVE MG/DL
GLUCOSE SERPL-MCNC: 92 MG/DL
HBA1C MFR BLD HPLC: 5.1 %
HBV CORE IGG+IGM SER QL: NONREACTIVE
HBV SURFACE AB SER QL: NONREACTIVE
HBV SURFACE AG SER QL: NONREACTIVE
HCT VFR BLD CALC: 39.6 %
HDLC SERPL-MCNC: 33 MG/DL
HETEROPH AB SER QL: NEGATIVE
HGB BLD-MCNC: 12 G/DL
HIV1+2 AB SPEC QL IA.RAPID: NONREACTIVE
IMM GRANULOCYTES NFR BLD AUTO: 0.4 %
KETONES URINE: ABNORMAL
LDLC SERPL CALC-MCNC: 81 MG/DL
LEUKOCYTE ESTERASE URINE: ABNORMAL
LYMPHOCYTES # BLD AUTO: 0.5 K/UL
LYMPHOCYTES NFR BLD AUTO: 9.2 %
MAN DIFF?: NORMAL
MCHC RBC-ENTMCNC: 26.8 PG
MCHC RBC-ENTMCNC: 30.3 GM/DL
MCV RBC AUTO: 88.4 FL
METHYLMALONATE SERPL-SCNC: 196 NMOL/L
MONOCYTES # BLD AUTO: 0.15 K/UL
MONOCYTES NFR BLD AUTO: 2.8 %
N GONORRHOEA RRNA SPEC QL NAA+PROBE: NOT DETECTED
NEUTROPHILS # BLD AUTO: 4.72 K/UL
NEUTROPHILS NFR BLD AUTO: 86.7 %
NITRITE URINE: POSITIVE
P-ANCA TITR SER IF: NEGATIVE
PH URINE: 5.5
PLATELET # BLD AUTO: 257 K/UL
POTASSIUM SERPL-SCNC: 4.1 MMOL/L
PROT SERPL-MCNC: 7.8 G/DL
PROTEIN URINE: 30 MG/DL
RBC # BLD: 4.48 M/UL
RBC # FLD: 14.7 %
RF+CCP IGG SER-IMP: NEGATIVE
RHEUMATOID FACT SER QL: <7 IU/ML
SODIUM SERPL-SCNC: 139 MMOL/L
SOURCE AMPLIFICATION: NORMAL
SPECIFIC GRAVITY URINE: 1.04
T PALLIDUM AB SER QL IA: NEGATIVE
TRIGL SERPL-MCNC: 127 MG/DL
TSH SERPL-ACNC: 1.83 UIU/ML
UROBILINOGEN URINE: 1 MG/DL
VIT B12 SERPL-MCNC: 403 PG/ML
WBC # FLD AUTO: 5.44 K/UL

## 2018-09-07 ENCOUNTER — APPOINTMENT (OUTPATIENT)
Dept: NEUROLOGY | Facility: CLINIC | Age: 33
End: 2018-09-07
Payer: COMMERCIAL

## 2018-09-07 VITALS
OXYGEN SATURATION: 97 % | DIASTOLIC BLOOD PRESSURE: 81 MMHG | TEMPERATURE: 98.8 F | HEIGHT: 67 IN | SYSTOLIC BLOOD PRESSURE: 163 MMHG | WEIGHT: 263 LBS | HEART RATE: 99 BPM | BODY MASS INDEX: 41.28 KG/M2

## 2018-09-07 DIAGNOSIS — Z86.718 PERSONAL HISTORY OF OTHER VENOUS THROMBOSIS AND EMBOLISM: ICD-10-CM

## 2018-09-07 DIAGNOSIS — Z86.711 PERSONAL HISTORY OF PULMONARY EMBOLISM: ICD-10-CM

## 2018-09-07 DIAGNOSIS — R51 HEADACHE: ICD-10-CM

## 2018-09-07 PROCEDURE — 99215 OFFICE O/P EST HI 40 MIN: CPT

## 2018-09-07 RX ORDER — MELOXICAM 7.5 MG/1
7.5 TABLET ORAL DAILY
Qty: 30 | Refills: 0 | Status: DISCONTINUED | COMMUNITY
Start: 2018-04-24 | End: 2018-09-07

## 2018-09-07 RX ORDER — PREDNISONE 10 MG/1
10 TABLET ORAL DAILY
Refills: 0 | Status: ACTIVE | COMMUNITY
Start: 2018-09-07

## 2018-11-01 NOTE — ED ADULT NURSE NOTE - BREATHING, MLM
Addended by: Pedro Pablo Blankenship on: 11/1/2018 11:33 AM     Modules accepted: Level of Service Spontaneous, unlabored and symmetrical

## 2018-11-06 ENCOUNTER — FORM ENCOUNTER (OUTPATIENT)
Age: 33
End: 2018-11-06

## 2018-11-07 ENCOUNTER — APPOINTMENT (OUTPATIENT)
Dept: MRI IMAGING | Facility: CLINIC | Age: 33
End: 2018-11-07
Payer: COMMERCIAL

## 2018-11-07 ENCOUNTER — OUTPATIENT (OUTPATIENT)
Dept: OUTPATIENT SERVICES | Facility: HOSPITAL | Age: 33
LOS: 1 days | End: 2018-11-07
Payer: COMMERCIAL

## 2018-11-07 PROCEDURE — A9585: CPT

## 2018-11-07 PROCEDURE — 70553 MRI BRAIN STEM W/O & W/DYE: CPT | Mod: 26

## 2018-11-07 PROCEDURE — 70553 MRI BRAIN STEM W/O & W/DYE: CPT

## 2018-11-14 ENCOUNTER — APPOINTMENT (OUTPATIENT)
Dept: NEUROLOGY | Facility: CLINIC | Age: 33
End: 2018-11-14
Payer: COMMERCIAL

## 2018-11-14 VITALS
DIASTOLIC BLOOD PRESSURE: 83 MMHG | SYSTOLIC BLOOD PRESSURE: 125 MMHG | BODY MASS INDEX: 41.28 KG/M2 | WEIGHT: 263 LBS | HEIGHT: 67 IN | HEART RATE: 99 BPM | OXYGEN SATURATION: 96 %

## 2018-11-14 PROCEDURE — 99214 OFFICE O/P EST MOD 30 MIN: CPT

## 2018-12-04 ENCOUNTER — OTHER (OUTPATIENT)
Age: 33
End: 2018-12-04

## 2018-12-18 ENCOUNTER — APPOINTMENT (OUTPATIENT)
Dept: NEUROLOGY | Facility: CLINIC | Age: 33
End: 2018-12-18
Payer: COMMERCIAL

## 2018-12-18 PROCEDURE — 95816 EEG AWAKE AND DROWSY: CPT

## 2019-02-14 ENCOUNTER — APPOINTMENT (OUTPATIENT)
Dept: NEUROLOGY | Facility: CLINIC | Age: 34
End: 2019-02-14
Payer: COMMERCIAL

## 2019-02-14 VITALS
HEART RATE: 90 BPM | TEMPERATURE: 98.4 F | WEIGHT: 277 LBS | OXYGEN SATURATION: 95 % | HEIGHT: 69 IN | DIASTOLIC BLOOD PRESSURE: 86 MMHG | BODY MASS INDEX: 41.03 KG/M2 | SYSTOLIC BLOOD PRESSURE: 127 MMHG

## 2019-02-14 DIAGNOSIS — M32.9 SYSTEMIC LUPUS ERYTHEMATOSUS, UNSPECIFIED: ICD-10-CM

## 2019-02-14 PROCEDURE — 99214 OFFICE O/P EST MOD 30 MIN: CPT

## 2019-02-14 RX ORDER — OXCARBAZEPINE 150 MG/1
150 TABLET, FILM COATED ORAL
Qty: 7 | Refills: 0 | Status: DISCONTINUED | COMMUNITY
Start: 2018-11-14 | End: 2019-02-14

## 2019-02-14 RX ORDER — ATENOLOL 50 MG/1
50 TABLET ORAL
Refills: 0 | Status: DISCONTINUED | COMMUNITY
Start: 2018-09-07 | End: 2019-02-14

## 2019-02-14 RX ORDER — OXCARBAZEPINE 300 MG/1
300 TABLET, FILM COATED ORAL
Qty: 120 | Refills: 3 | Status: DISCONTINUED | COMMUNITY
Start: 2017-12-13 | End: 2019-02-14

## 2019-02-19 LAB
FOLATE SERPL-MCNC: 9.2 NG/ML
LEVETIRACETAM SERPL-MCNC: 12.4 MCG/ML
TSH SERPL-ACNC: 1.48 UIU/ML
VIT B12 SERPL-MCNC: 369 PG/ML

## 2019-02-19 RX ORDER — APIXABAN 5 MG/1
5 TABLET, FILM COATED ORAL
Qty: 60 | Refills: 5 | Status: ACTIVE | COMMUNITY
Start: 2018-09-07

## 2019-02-19 RX ORDER — HYDROXYCHLOROQUINE SULFATE 200 MG/1
200 TABLET ORAL TWICE DAILY
Refills: 0 | Status: ACTIVE | COMMUNITY
Start: 2018-09-07

## 2019-02-20 ENCOUNTER — RX RENEWAL (OUTPATIENT)
Age: 34
End: 2019-02-20

## 2019-04-25 ENCOUNTER — APPOINTMENT (OUTPATIENT)
Dept: NEUROLOGY | Facility: CLINIC | Age: 34
End: 2019-04-25
Payer: COMMERCIAL

## 2019-04-25 VITALS
HEART RATE: 100 BPM | BODY MASS INDEX: 42.8 KG/M2 | SYSTOLIC BLOOD PRESSURE: 126 MMHG | WEIGHT: 289 LBS | HEIGHT: 69 IN | DIASTOLIC BLOOD PRESSURE: 82 MMHG | OXYGEN SATURATION: 98 %

## 2019-04-25 DIAGNOSIS — R41.3 OTHER AMNESIA: ICD-10-CM

## 2019-04-25 PROCEDURE — 99213 OFFICE O/P EST LOW 20 MIN: CPT

## 2019-04-25 NOTE — PHYSICAL EXAM
[FreeTextEntry1] : gen: no acute distress.\par HEENT: trachea midline, no jaundice or icterus.\par Alertness: eyes open, pt attentive to examiner.\par Orientation: person accurate, date accurate, place accurate.\par Language: naming intact. Repetition intact. No paraphasias or neologisms. Fluent with appropriate sentences.\par Attention: intact.\par II: Visual fields intact.\par III, IV, VI: EOMI. No nystagmus. PERRLA 3>2 bilaterally. \par V: intact to light touch.\par VII: smile symmetrical. Eyebrow elevation symmetrical. Eye closure symmetrical. No flattening of nasolabial fold.\par VIII: Able to localize finger rub.\par IX, X: palate elevation symmetrical.\par XI: sternocleidomastoid 5R/5L, trapezius 5R/5L\par XII: no tongue deviation.\par Motor: no atrophy or fasciculations. No tremor. No hypo/hyperkinesia. No pronator drift. Tone normal. 5/5 x 4 ext\par Coordination: finger to nose without dysmetria or overshoot R/L. \par Gait: normal \par Sensory: intact on R and L hemibody to light touch.

## 2019-07-01 ENCOUNTER — RX RENEWAL (OUTPATIENT)
Age: 34
End: 2019-07-01

## 2019-07-15 ENCOUNTER — RX RENEWAL (OUTPATIENT)
Age: 34
End: 2019-07-15

## 2019-08-01 ENCOUNTER — RX RENEWAL (OUTPATIENT)
Age: 34
End: 2019-08-01

## 2019-08-06 ENCOUNTER — RX RENEWAL (OUTPATIENT)
Age: 34
End: 2019-08-06

## 2019-08-06 RX ORDER — LEVETIRACETAM 500 MG/1
500 TABLET, FILM COATED ORAL TWICE DAILY
Qty: 120 | Refills: 1 | Status: COMPLETED | COMMUNITY
Start: 2018-11-14 | End: 2019-08-06

## 2019-08-08 ENCOUNTER — APPOINTMENT (OUTPATIENT)
Dept: NEUROLOGY | Facility: CLINIC | Age: 34
End: 2019-08-08
Payer: COMMERCIAL

## 2019-08-08 VITALS — DIASTOLIC BLOOD PRESSURE: 82 MMHG | SYSTOLIC BLOOD PRESSURE: 116 MMHG

## 2019-08-08 VITALS
WEIGHT: 293 LBS | TEMPERATURE: 98.5 F | OXYGEN SATURATION: 99 % | BODY MASS INDEX: 43.4 KG/M2 | HEART RATE: 104 BPM | HEIGHT: 69 IN

## 2019-08-08 DIAGNOSIS — E53.8 DEFICIENCY OF OTHER SPECIFIED B GROUP VITAMINS: ICD-10-CM

## 2019-08-08 LAB
FOLATE SERPL-MCNC: 11.5 NG/ML
VIT B12 SERPL-MCNC: 438 PG/ML

## 2019-08-08 PROCEDURE — 99214 OFFICE O/P EST MOD 30 MIN: CPT

## 2019-08-09 PROCEDURE — 95953: CPT

## 2019-08-10 ENCOUNTER — APPOINTMENT (OUTPATIENT)
Dept: NEUROLOGY | Facility: CLINIC | Age: 34
End: 2019-08-10

## 2019-08-10 PROCEDURE — 95953: CPT

## 2019-08-13 LAB — LEVETIRACETAM SERPL-MCNC: 16.8 MCG/ML

## 2019-09-27 ENCOUNTER — RX RENEWAL (OUTPATIENT)
Age: 34
End: 2019-09-27

## 2019-11-07 ENCOUNTER — APPOINTMENT (OUTPATIENT)
Dept: NEUROLOGY | Facility: CLINIC | Age: 34
End: 2019-11-07
Payer: COMMERCIAL

## 2019-11-07 ENCOUNTER — APPOINTMENT (OUTPATIENT)
Dept: NEUROLOGY | Facility: CLINIC | Age: 34
End: 2019-11-07

## 2019-11-07 VITALS
SYSTOLIC BLOOD PRESSURE: 142 MMHG | HEIGHT: 69 IN | OXYGEN SATURATION: 98 % | HEART RATE: 115 BPM | DIASTOLIC BLOOD PRESSURE: 84 MMHG | TEMPERATURE: 97.9 F | BODY MASS INDEX: 43.4 KG/M2 | WEIGHT: 293 LBS

## 2019-11-07 PROCEDURE — 99215 OFFICE O/P EST HI 40 MIN: CPT

## 2019-11-07 RX ORDER — LEVETIRACETAM 1000 MG/1
1000 TABLET, FILM COATED ORAL
Qty: 60 | Refills: 5 | Status: DISCONTINUED | COMMUNITY
Start: 2019-09-27 | End: 2019-11-07

## 2019-11-08 ENCOUNTER — APPOINTMENT (OUTPATIENT)
Dept: NEUROLOGY | Facility: CLINIC | Age: 34
End: 2019-11-08

## 2019-11-28 NOTE — ED ADULT TRIAGE NOTE - MEANS OF ARRIVAL
ambulatory NOw well-chicho without HA or any compl;aints. VSS CT neg. No evidence of meningitis, bleed, mass or other threatening intracranial process at this point, however mom and I discussed at length what to watch and return for and they are comfortable with this plan of supportive care for likely benign headache and will follow up with their pmd in 1-2d and neuro HA team NOw well-chicho without HA or any compl;aints. Never HA in our ER. VSS CT neg. No evidence of meningitis, bleed, mass or other threatening intracranial process at this point, however mom and I discussed at length what to watch and return for and they are comfortable with this plan of supportive care for likely benign headache and will follow up with their pmd in 1-2d and neuro HA team

## 2019-12-19 ENCOUNTER — APPOINTMENT (OUTPATIENT)
Dept: NEUROLOGY | Facility: CLINIC | Age: 34
End: 2019-12-19
Payer: COMMERCIAL

## 2019-12-19 PROCEDURE — 95806 SLEEP STUDY UNATT&RESP EFFT: CPT

## 2019-12-20 ENCOUNTER — APPOINTMENT (OUTPATIENT)
Dept: NEUROLOGY | Facility: CLINIC | Age: 34
End: 2019-12-20

## 2019-12-23 ENCOUNTER — RX RENEWAL (OUTPATIENT)
Age: 34
End: 2019-12-23

## 2020-01-03 ENCOUNTER — TRANSCRIPTION ENCOUNTER (OUTPATIENT)
Age: 35
End: 2020-01-03

## 2020-02-12 ENCOUNTER — APPOINTMENT (OUTPATIENT)
Dept: NEUROLOGY | Facility: CLINIC | Age: 35
End: 2020-02-12
Payer: SELF-PAY

## 2020-02-12 VITALS
HEIGHT: 69 IN | WEIGHT: 293 LBS | DIASTOLIC BLOOD PRESSURE: 87 MMHG | OXYGEN SATURATION: 98 % | HEART RATE: 119 BPM | SYSTOLIC BLOOD PRESSURE: 123 MMHG | BODY MASS INDEX: 43.4 KG/M2

## 2020-02-12 DIAGNOSIS — G40.909 EPILEPSY, UNSPECIFIED, NOT INTRACTABLE, W/OUT STATUS EPILEPTICUS: ICD-10-CM

## 2020-02-12 PROCEDURE — 99215 OFFICE O/P EST HI 40 MIN: CPT

## 2020-02-28 ENCOUNTER — TRANSCRIPTION ENCOUNTER (OUTPATIENT)
Age: 35
End: 2020-02-28

## 2020-03-09 ENCOUNTER — RX RENEWAL (OUTPATIENT)
Age: 35
End: 2020-03-09

## 2020-04-21 ENCOUNTER — APPOINTMENT (OUTPATIENT)
Dept: NEUROLOGY | Facility: CLINIC | Age: 35
End: 2020-04-21

## 2020-04-23 ENCOUNTER — APPOINTMENT (OUTPATIENT)
Dept: NEUROLOGY | Facility: CLINIC | Age: 35
End: 2020-04-23

## 2020-04-24 ENCOUNTER — APPOINTMENT (OUTPATIENT)
Dept: NEUROLOGY | Facility: CLINIC | Age: 35
End: 2020-04-24
Payer: SELF-PAY

## 2020-04-24 PROCEDURE — 96116 NUBHVL XM PHYS/QHP 1ST HR: CPT | Mod: 95

## 2020-05-13 ENCOUNTER — RX RENEWAL (OUTPATIENT)
Age: 35
End: 2020-05-13

## 2020-05-14 ENCOUNTER — TRANSCRIPTION ENCOUNTER (OUTPATIENT)
Age: 35
End: 2020-05-14

## 2020-06-09 NOTE — HISTORY OF PRESENT ILLNESS
[Verbal consent obtained from patient] : the patient, [unfilled] [FreeTextEntry1] : \par Patient Location at time of Video Visit: Home, [facility]  \par Provider Location at time of Visit: Home, [facility]\par Other Participants Present: None\par I obtained patient consent and agreement for this telehealth [video/telephone] encounter. \par \par  NEUROPSYCHOLOGY CONSULTATION REPORT\par \par  \par Name:    	Ana Choudhary	YOB: 1985\par Age:	34 years	Setting:	Telehealth\par Education:	12 years  	Medical Record #:	[id number]\par Sex:	Female	Referring Phys:	Kathie Cruz MD\par Handedness:	Right	Evaluation Date:	04/24/2020\par Provider:	Adrienne Day, Ph.D.	Examiners:	Sheila\par 		        \par Reason for Referral:  \par Patient is a 34 year old woman with a history of epilepsy and cognitive difficlties. She presented for a formal assessment of her cognitive and behavioral functioning.  Information from this evaluation will be used to guide diagnosis and treatment planning in addition to providing a baseline for future comparison. \par \par The following information was obtained from the patient and from review of available medical records. \par \par Relevant History: \par 	Seizures began December 8th, 2017. Occurred at work. Patient does not recall the event – first memory after the seizure was on the floor of her office. She was taken to the hospital and admitted for a couple of days. Developed blood clots and admitted to ICU. Diagnosed with Lupus. First two years patient denied experiencing an aura for the first two years. She experienced convulsive seizures the first two years. Over the last year, she has begun to experience aura. Semiology has changed from GTC to more focal partial. Most recent seizurein January patient reported right facial twitching and right hand shaking. Prior to that she had a seizure in September. However she reported that her seizures are usually 7-8 months apart. \par \par Cognitive Complaints:\par •	Feels a little foggy after seizures- takes her a few minutes to “come back”\par •	Mild forgetfulness\par •	ADLs: Independent \par \par Medical History: \par •	Other Medical Conditions: Lupus, blood blots, epilepsy\par •	Psychiatric History: Feels as though she has become more kumar and short-tempered since taking Keppra; patient has not seen a psychologist/therapist to help with her moodiness; unsure if she is depressed; reported that many of her family members have passed away recently – reported that her mother passed away on April 1st \par •	Current Medications: Keppra, Oxcarbazepine, prednisone, Eliquis, hydroxycholorquine sulfate\par •	Surgical History:  Denied \par •	History of Head Injury: Denied \par •	Neuro-imaging History: Brain MRI November 2018 “Unremarkable hippocampal formation bilaterally and no evidence of mesial temporal sclerosis; no intracranial mass, acute infarct, or abnormal enhancement, indcidental small arachnoid cyst at right cerebellar meduallary junction”\par •	EEG History: aEEG August 2019 “EEG remained normal throughout the study”\par •	Alcohol/Drug Use: Denied \par •	Tobacco Use:  Denied \par •	Sleep: “I don’t sleep much and other times I sleep a lot”\par •	Appetite/Diet: Denied significant changes; thinks maybe she eats a little more \par •	Exercise:  Denied \par 	\par Family History: \par •	Neurological:  Denied \par •	Psychiatric: Sister with depression \par \par Social History:\par •	Raised: [place], [place] \par •	Now Lives: [place], [place]\par •	Marital: Single; no children \par •	Primary Language: English; also speaks Guyanese \par •	Typical day: Patient has been caring for her nieces and not currently working due to COVID \par \par Education and Occupation History:\par •	Education: Graduated high school \par •	Occupation: Furloughed employee due to COVID; 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 \par \par Behavioral Observations:\par •	Appearance- Appeared appropriately dressed and well groomed per video   \par •	Behavior/Attitude- Positive attitude towards interview \par •	Speech/Language- WNL\par •	Mood/Affect- Mood reported to be euthymic but tearful when discussing the passing of her mother; affect was congruent \par •	Sensory/Motor- Performance was not limited by any obvious sensory (i.e., audotry, visual) difficulties \par •	Cognitive Process- Coherent and goal directed\par •	Motivation/Effort- WNL\par \par RESULTS, FORMULATION & RECOMMENDATION:\par Patient is a 34 year old woman with a history of epilepsy and cognitive difficlties.  She presented via telehealth video/telephone intake interview prior to scheduling an outpatient evaluation to assess her cognitive and behavioral functioning.  \par •	Orientation: Date: 24th, Month:  April, Year: 2020, Day of the week: Friday\par •	Patient was oriented, appears to be well adjusted from a psychological perspective with regards to COVID 19 outbreak. She is reportedly following all of the CDC guidelines and has a support network. \par •	Patient should come for comprehensive outpatient neuropsychological assessment. \par •	He/she was advised that our office will call her when it is safe to conduct the outpatient testing. \par •	Continued monitoring and treatment with Dr. Kathie Cruz.\par •	Patient was told to call our office should she experience distress/need additional supports during this time. \par \par \par Thank you for allowing me to participate in Ms. Choudhary’s care.  Please don’t hesitate to contact if you have any questions. \par \par \par         \par ______________________\par Adrienne Day, PhD, ABPP\par Board Certified in Clinical Neuropsychology\par \par \par cc:	Kathie Cruz\par 	[facility] Medical Records\par Neuropsychology File\par \par CPT CODING:  \par [id number]: 2 hours - these hours include PhD time in record review, interview with patient and/or family, test selection, and generation of report.  \par \par

## 2020-07-14 ENCOUNTER — RX RENEWAL (OUTPATIENT)
Age: 35
End: 2020-07-14

## 2020-11-09 ENCOUNTER — TRANSCRIPTION ENCOUNTER (OUTPATIENT)
Age: 35
End: 2020-11-09

## 2021-04-11 NOTE — HISTORY OF PRESENT ILLNESS
[FreeTextEntry1] : Interim Hx: 19\par Current AED: Keppra 1 g BID\par No seizure reported.\par Compliant with medication. Some irritability/depression but stressed from work \par \par Has not followed with Rheum\par Seeing heme for AC\par Neurpsych pending\par \par 19\par LEV level 12.4, B12/folate, 369/9.2, TSH 1.48\par \par On B12 supplement\par _______________\par Interim Hx: 19\par Patient transitioned from OXC to Keppra 750mg BID. \par After being on Keppra for about a month had a witnessed GTC end of Dec at work.\par Taken to ER and was released later that day. She reports she felt off the whole day, but no clear illness or trigger. Keppra dose not changed. Denies missing doses.\par First 2 months after initiating Keppra felt she felt irritable and fatigue  but unclear if only medication related. Recently has been feeling better but also reports some depression. Has not followed up with Rheumatologist recently- still on steroids daily. \par Also complaints of increasing forgetfulness, will miss appointments b/c of trouble remembering.\par \par rEEG 18- normal\par _________________\par Interim Hx: 11/15/2018\par No reported seizures. Patient has been having trouble refilling OXC due to insurance issues and now has run out. Took only 300 mg yesterday and today.\par Otherwise doing well, no other complaints.\par \par MRI brain w/wout contrast 18\par IMPRESSION:\par 1. Unremarkable hippocampal formation bilaterally and no MR evidence of mesial temporal sclerosis. \par 2. No intracranial mass, acute infarct, or abnormal enhancement. \par 3. Incidental small arachnoid cyst at right cerebellar medullary junction, unchanged. \par \par ________________\par Interim Hx: 2018\par In April patient was not feeling well and was hospitalized for about a week . She was found to have DVTs and PE. Diagnosed with Lupus. Currently following closely with Rheumatologist in [place].\par \par Current meds include: Plaquenil, Prednisone 10mg/d, Atenolol, Eliques, OXC 600mg BID \par \par She states that she ran out of her OXC on Wednesday. She believes she had a seizure this morning. She is had a period of time she is unable to account for. Not witnessed.\par \par Frequent diffuse head pain, also occasional headaches ~5x/week. Takes Tylenol with some relief\par Resumed working recently. Predominantly desk job\par Mother-Lupus\par No Fhx of seizures\par __________\par Previous Hx:\par Seen by Dr Manzano\par 33 y/o RH woman presenting for follow-up of seizures.\par \par 17: The patient presented to [facility] 17 after having a generalized convulsive seizure. \par She reports feeling her normal self that morning, and talking with a friend on the phone at work, then the next thing she knew she woke up on the ground. Her coworkers who were with her and witnessed the event stated that she began shaking all over her body, foaming at the mouth, and was unresponsive. She was slow to regain consciousness, but had returned to baseline by the time EMS showed up. She bit her lip, but was not incontinent of urine or stool. Her mother who was at bedside said that she has never had seizures as a child, and was always healthy. She has a cousin who had epilepsy (details unclear). She did not have any prodromal symptoms prior to the seizure. She has never had any head trauma, and did not have any recent illnesses. She denied recent drug/alcohol use.\par She was transferred to the EMU at [facility], where continuous EEG was remarkable for a subclinical left temporal seizure while awake. There were no interictal abnormalities. 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 Her rash has since resolved and she has had no further seizures or any other complaints. \par \par Epilepsy risk factors:\par Head injury with subsequent LOC?: no\par Febrile seizures in infancy?: no\par  injury?: no\par Developmental delay?: no\par Hx of CNS infection?: no\par Family hx of epilepsy?: cousin, etiology unclear. \par Known CNS pathology?: right occipital dural calcification vs meningioma. \par \par Exam was normal. Plan was to continue Trileptal, MRI brain w/wo con.\par \par 18: At the end of December she said she had a fever for 1 week, and also rash on her arms and chest. She was advised to go the ER, and she said they give her Benadryl. The rash subsequently resolved. She is compliant with Trileptal 600 mg bid, with no seizure since the last visit. She has no further rash and is tolerating the Trileptal well, but has some lethargy. No other complaints. \par Plan was to continue Trileptal, check labs, MRI brain.\par \par Today: the patient said she has had headaches almost every day for the past few weeks. She has also had some hair loss. She is also seeing occasional flashes of lights and dark spots in her entire visual field. These visual symptoms happen independently of her headaches, including days when she has no headache. The headaches are left frontal, and she can't describe the quality of the pain, lasting a couple hours at a time, onset usually in afternoon. Non-pulsatile. No known triggers. No known alleviating factors. No associated symptoms. She tried Motrin once or twice and this helped. She has had no seizures since her last visit and she is compliant with Trileptal with no intolerable side effects. No other complaints.  none

## 2021-07-30 NOTE — DISCHARGE NOTE ADULT - NS MD DC FALL RISK RISK
(4) rarely moist For information on Fall & Injury Prevention, visit [email or web address]

## 2021-08-30 ENCOUNTER — RX RENEWAL (OUTPATIENT)
Age: 36
End: 2021-08-30

## 2021-11-26 ENCOUNTER — RX RENEWAL (OUTPATIENT)
Age: 36
End: 2021-11-26

## 2022-02-21 ENCOUNTER — RX RENEWAL (OUTPATIENT)
Age: 37
End: 2022-02-21

## 2022-02-21 RX ORDER — OXCARBAZEPINE 150 MG/1
150 TABLET, FILM COATED ORAL TWICE DAILY
Qty: 180 | Refills: 3 | Status: ACTIVE | COMMUNITY
Start: 2020-05-13 | End: 1900-01-01

## 2022-06-16 ENCOUNTER — FORM ENCOUNTER (OUTPATIENT)
Age: 37
End: 2022-06-16

## 2022-06-23 RX ORDER — OXCARBAZEPINE 150 MG/1
150 TABLET, FILM COATED ORAL TWICE DAILY
Qty: 540 | Refills: 1 | Status: ACTIVE | COMMUNITY
Start: 2020-02-12 | End: 1900-01-01

## 2022-07-22 RX ORDER — LEVETIRACETAM 750 MG/1
750 TABLET, EXTENDED RELEASE ORAL DAILY
Qty: 120 | Refills: 2 | Status: ACTIVE | COMMUNITY
Start: 2019-04-25 | End: 1900-01-01

## 2022-10-21 ENCOUNTER — TRANSCRIPTION ENCOUNTER (OUTPATIENT)
Age: 37
End: 2022-10-21

## 2022-10-21 RX ORDER — LEVETIRACETAM 750 MG/1
750 TABLET, EXTENDED RELEASE ORAL
Qty: 120 | Refills: 3 | Status: ACTIVE | COMMUNITY
Start: 2020-06-04 | End: 1900-01-01

## 2022-10-21 RX ORDER — LEVETIRACETAM 750 MG/1
750 TABLET, FILM COATED ORAL
Qty: 60 | Refills: 3 | Status: ACTIVE | COMMUNITY
Start: 2017-12-11 | End: 1900-01-01
